# Patient Record
Sex: MALE | Race: WHITE | NOT HISPANIC OR LATINO | Employment: FULL TIME | URBAN - METROPOLITAN AREA
[De-identification: names, ages, dates, MRNs, and addresses within clinical notes are randomized per-mention and may not be internally consistent; named-entity substitution may affect disease eponyms.]

---

## 2017-03-08 ENCOUNTER — HOSPITAL ENCOUNTER (OUTPATIENT)
Dept: PAIN MEDICINE | Facility: HOSPITAL | Age: 37
Discharge: HOME | End: 2017-03-08
Attending: ANESTHESIOLOGY | Admitting: ANESTHESIOLOGY

## 2017-04-18 ENCOUNTER — HOSPITAL ENCOUNTER (OUTPATIENT)
Dept: PAIN MEDICINE | Facility: HOSPITAL | Age: 37
Discharge: HOME | End: 2017-04-18
Attending: ANESTHESIOLOGY | Admitting: ANESTHESIOLOGY

## 2017-05-17 ENCOUNTER — HOSPITAL ENCOUNTER (OUTPATIENT)
Dept: PAIN MEDICINE | Facility: HOSPITAL | Age: 37
Discharge: HOME | End: 2017-05-17
Attending: ANESTHESIOLOGY | Admitting: ANESTHESIOLOGY

## 2017-06-01 ENCOUNTER — HOSPITAL ENCOUNTER (OUTPATIENT)
Dept: PAIN MEDICINE | Facility: HOSPITAL | Age: 37
Discharge: HOME | End: 2017-06-01
Attending: ANESTHESIOLOGY | Admitting: ANESTHESIOLOGY

## 2017-06-08 ENCOUNTER — HOSPITAL ENCOUNTER (OUTPATIENT)
Dept: PAIN MEDICINE | Facility: HOSPITAL | Age: 37
Discharge: HOME | End: 2017-06-08
Attending: ANESTHESIOLOGY | Admitting: ANESTHESIOLOGY

## 2017-06-21 ENCOUNTER — HOSPITAL ENCOUNTER (OUTPATIENT)
Dept: PAIN MEDICINE | Facility: HOSPITAL | Age: 37
Discharge: HOME | End: 2017-06-21
Attending: ANESTHESIOLOGY | Admitting: ANESTHESIOLOGY

## 2017-06-29 ENCOUNTER — HOSPITAL ENCOUNTER (OUTPATIENT)
Dept: PAIN MEDICINE | Facility: HOSPITAL | Age: 37
Discharge: HOME | End: 2017-06-29
Attending: ANESTHESIOLOGY | Admitting: ANESTHESIOLOGY

## 2017-08-17 ENCOUNTER — HOSPITAL ENCOUNTER (OUTPATIENT)
Dept: PAIN MEDICINE | Facility: HOSPITAL | Age: 37
Discharge: HOME | End: 2017-08-17
Attending: ANESTHESIOLOGY | Admitting: ANESTHESIOLOGY

## 2017-09-12 ENCOUNTER — HOSPITAL ENCOUNTER (OUTPATIENT)
Dept: PAIN MEDICINE | Facility: HOSPITAL | Age: 37
Discharge: HOME | End: 2017-09-12
Attending: ANESTHESIOLOGY | Admitting: ANESTHESIOLOGY

## 2017-09-27 ENCOUNTER — HOSPITAL ENCOUNTER (OUTPATIENT)
Dept: PAIN MEDICINE | Facility: HOSPITAL | Age: 37
Discharge: HOME | End: 2017-09-27
Attending: ANESTHESIOLOGY | Admitting: ANESTHESIOLOGY

## 2017-10-09 ENCOUNTER — HOSPITAL ENCOUNTER (OUTPATIENT)
Dept: PAIN MEDICINE | Facility: HOSPITAL | Age: 37
Discharge: HOME | End: 2017-10-09
Attending: ANESTHESIOLOGY | Admitting: ANESTHESIOLOGY

## 2017-10-12 ENCOUNTER — HOSPITAL ENCOUNTER (OUTPATIENT)
Dept: PAIN MEDICINE | Facility: HOSPITAL | Age: 37
Discharge: HOME | End: 2017-10-12
Attending: ANESTHESIOLOGY | Admitting: ANESTHESIOLOGY

## 2017-10-24 ENCOUNTER — HOSPITAL ENCOUNTER (OUTPATIENT)
Dept: PAIN MEDICINE | Facility: HOSPITAL | Age: 37
Discharge: HOME | End: 2017-10-24
Attending: ANESTHESIOLOGY | Admitting: ANESTHESIOLOGY

## 2017-11-14 ENCOUNTER — HOSPITAL ENCOUNTER (OUTPATIENT)
Dept: PAIN MEDICINE | Facility: HOSPITAL | Age: 37
Discharge: HOME | End: 2017-11-14
Attending: ANESTHESIOLOGY | Admitting: ANESTHESIOLOGY

## 2017-11-15 ENCOUNTER — HOSPITAL ENCOUNTER (OUTPATIENT)
Dept: PAIN MEDICINE | Facility: HOSPITAL | Age: 37
Discharge: HOME | End: 2017-11-15
Attending: ANESTHESIOLOGY | Admitting: ANESTHESIOLOGY

## 2017-11-30 ENCOUNTER — HOSPITAL ENCOUNTER (OUTPATIENT)
Dept: PAIN MEDICINE | Facility: HOSPITAL | Age: 37
Discharge: HOME | End: 2017-11-30
Attending: ANESTHESIOLOGY | Admitting: ANESTHESIOLOGY

## 2017-12-06 ENCOUNTER — HOSPITAL ENCOUNTER (OUTPATIENT)
Dept: PAIN MEDICINE | Facility: HOSPITAL | Age: 37
Discharge: HOME | End: 2017-12-06
Attending: ANESTHESIOLOGY | Admitting: ANESTHESIOLOGY

## 2017-12-27 ENCOUNTER — HOSPITAL ENCOUNTER (OUTPATIENT)
Dept: PAIN MEDICINE | Facility: HOSPITAL | Age: 37
Discharge: HOME | End: 2017-12-27
Attending: ANESTHESIOLOGY | Admitting: ANESTHESIOLOGY

## 2018-01-17 ENCOUNTER — HOSPITAL ENCOUNTER (OUTPATIENT)
Dept: PAIN MEDICINE | Facility: HOSPITAL | Age: 38
Discharge: HOME | End: 2018-01-17
Attending: ANESTHESIOLOGY | Admitting: ANESTHESIOLOGY

## 2018-01-23 ENCOUNTER — HOSPITAL ENCOUNTER (OUTPATIENT)
Dept: PAIN MEDICINE | Facility: HOSPITAL | Age: 38
Discharge: HOME | End: 2018-01-23
Attending: ANESTHESIOLOGY | Admitting: ANESTHESIOLOGY

## 2018-02-14 ENCOUNTER — HOSPITAL ENCOUNTER (OUTPATIENT)
Dept: PAIN MEDICINE | Facility: HOSPITAL | Age: 38
Discharge: HOME | End: 2018-02-14
Attending: ANESTHESIOLOGY | Admitting: ANESTHESIOLOGY

## 2018-03-13 ENCOUNTER — HOSPITAL ENCOUNTER (OUTPATIENT)
Dept: PAIN MEDICINE | Facility: HOSPITAL | Age: 38
Discharge: HOME | End: 2018-03-13
Attending: ANESTHESIOLOGY | Admitting: ANESTHESIOLOGY

## 2018-04-10 ENCOUNTER — HOSPITAL ENCOUNTER (OUTPATIENT)
Dept: PAIN MEDICINE | Facility: HOSPITAL | Age: 38
Discharge: HOME | End: 2018-04-10
Attending: ANESTHESIOLOGY | Admitting: ANESTHESIOLOGY

## 2018-10-01 ENCOUNTER — HOSPITAL ENCOUNTER (OUTPATIENT)
Dept: FAMILY MEDICINE CLINIC | Facility: CLINIC | Age: 38
Setting detail: SPECIMEN
Discharge: HOME | End: 2018-10-01
Attending: FAMILY MEDICINE | Admitting: FAMILY MEDICINE

## 2018-10-01 LAB
ALBUMIN SERPL-MCNC: 4.3 G/DL (ref 3.5–4.8)
ALBUMIN/GLOB SERPL: 1.3 {RATIO} (ref 1–1.7)
ALP SERPL-CCNC: 109 IU/L (ref 32–91)
ALT SERPL-CCNC: 29 IU/L (ref 17–63)
ANION GAP SERPL CALC-SCNC: 11.4 MMOL/L (ref 10–20)
AST SERPL-CCNC: 26 IU/L (ref 15–41)
BASOPHILS # BLD AUTO: 0 10*3/UL (ref 0–0.2)
BASOPHILS NFR BLD AUTO: 1 % (ref 0–2)
BILIRUB SERPL-MCNC: 0.9 MG/DL (ref 0.3–1.2)
BUN SERPL-MCNC: 9 MG/DL (ref 8–20)
BUN/CREAT SERPL: 11.3 (ref 6.2–20.3)
CALCIUM SERPL-MCNC: 9.7 MG/DL (ref 8.9–10.3)
CHLORIDE SERPL-SCNC: 106 MMOL/L (ref 101–111)
CONV CO2: 26 MMOL/L (ref 22–32)
CONV TOTAL PROTEIN: 7.5 G/DL (ref 6.1–7.9)
CREAT UR-MCNC: 0.8 MG/DL (ref 0.7–1.2)
DIFFERENTIAL METHOD BLD: (no result)
EOSINOPHIL # BLD AUTO: 0.4 10*3/UL (ref 0–0.3)
EOSINOPHIL # BLD AUTO: 7 % (ref 0–3)
ERYTHROCYTE [DISTWIDTH] IN BLOOD BY AUTOMATED COUNT: 13 % (ref 11.5–14.5)
GLOBULIN UR ELPH-MCNC: 3.2 G/DL (ref 2.5–3.8)
GLUCOSE SERPL-MCNC: 81 MG/DL (ref 65–99)
HCT VFR BLD AUTO: 45.2 % (ref 40–54)
HGB BLD-MCNC: 16 G/DL (ref 14–18)
LYMPHOCYTES # BLD AUTO: 2.3 10*3/UL (ref 0.8–4.8)
LYMPHOCYTES NFR BLD AUTO: 36 % (ref 18–42)
MCH RBC QN AUTO: 31.2 PG (ref 26–32)
MCHC RBC AUTO-ENTMCNC: 35.3 G/DL (ref 32–36)
MCV RBC AUTO: 88.3 FL (ref 80–94)
MONOCYTES # BLD AUTO: 0.4 10*3/UL (ref 0.1–1.3)
MONOCYTES NFR BLD AUTO: 7 % (ref 2–11)
NEUTROPHILS # BLD AUTO: 3.3 10*3/UL (ref 2.3–8.6)
NEUTROPHILS NFR BLD AUTO: 49 % (ref 50–75)
NRBC BLD AUTO-RTO: 0 /100{WBCS}
NRBC/RBC NFR BLD MANUAL: 0 10*3/UL
PLATELET # BLD AUTO: 206 10*3/UL (ref 150–450)
PMV BLD AUTO: 8.5 FL (ref 7.4–10.4)
POTASSIUM SERPL-SCNC: 4.4 MMOL/L (ref 3.6–5.1)
RBC # BLD AUTO: 5.12 10*6/UL (ref 4.6–6)
SODIUM SERPL-SCNC: 139 MMOL/L (ref 136–144)
WBC # BLD AUTO: 6.5 10*3/UL (ref 4.5–11.5)

## 2018-12-27 ENCOUNTER — HOSPITAL ENCOUNTER (OUTPATIENT)
Dept: SLEEP MEDICINE | Facility: HOSPITAL | Age: 38
Discharge: HOME | End: 2018-12-27
Attending: PSYCHIATRY & NEUROLOGY | Admitting: PSYCHIATRY & NEUROLOGY

## 2019-02-28 ENCOUNTER — HOSPITAL ENCOUNTER (OUTPATIENT)
Dept: OTHER | Facility: HOSPITAL | Age: 39
Discharge: HOME | End: 2019-02-28
Attending: FAMILY MEDICINE | Admitting: FAMILY MEDICINE

## 2019-02-28 LAB
ALBUMIN SERPL-MCNC: 4 G/DL (ref 3.5–4.8)
ALBUMIN/GLOB SERPL: 1.3 {RATIO} (ref 1–1.7)
ALP SERPL-CCNC: 88 IU/L (ref 32–91)
ALT SERPL-CCNC: 25 IU/L (ref 17–63)
ANION GAP SERPL CALC-SCNC: 13.9 MMOL/L (ref 10–20)
AST SERPL-CCNC: 23 IU/L (ref 15–41)
BASOPHILS # BLD AUTO: 0 10*3/UL (ref 0–0.2)
BASOPHILS NFR BLD AUTO: 1 % (ref 0–2)
BILIRUB SERPL-MCNC: 0.5 MG/DL (ref 0.3–1.2)
BUN SERPL-MCNC: 10 MG/DL (ref 8–20)
BUN/CREAT SERPL: 11.1 (ref 6.2–20.3)
CALCIUM SERPL-MCNC: 9 MG/DL (ref 8.9–10.3)
CHLORIDE SERPL-SCNC: 102 MMOL/L (ref 101–111)
CONV CO2: 25 MMOL/L (ref 22–32)
CONV TOTAL PROTEIN: 7.1 G/DL (ref 6.1–7.9)
CREAT UR-MCNC: 0.9 MG/DL (ref 0.7–1.2)
DIFFERENTIAL METHOD BLD: (no result)
EOSINOPHIL # BLD AUTO: 0.5 10*3/UL (ref 0–0.3)
EOSINOPHIL # BLD AUTO: 9 % (ref 0–3)
ERYTHROCYTE [DISTWIDTH] IN BLOOD BY AUTOMATED COUNT: 12.6 % (ref 11.5–14.5)
GLOBULIN UR ELPH-MCNC: 3.1 G/DL (ref 2.5–3.8)
GLUCOSE SERPL-MCNC: 85 MG/DL (ref 65–99)
HCT VFR BLD AUTO: 43.4 % (ref 40–54)
HGB BLD-MCNC: 15.1 G/DL (ref 14–18)
LYMPHOCYTES # BLD AUTO: 2.2 10*3/UL (ref 0.8–4.8)
LYMPHOCYTES NFR BLD AUTO: 37 % (ref 18–42)
MCH RBC QN AUTO: 31.7 PG (ref 26–32)
MCHC RBC AUTO-ENTMCNC: 34.7 G/DL (ref 32–36)
MCV RBC AUTO: 91.4 FL (ref 80–94)
MONOCYTES # BLD AUTO: 0.4 10*3/UL (ref 0.1–1.3)
MONOCYTES NFR BLD AUTO: 7 % (ref 2–11)
NEUTROPHILS # BLD AUTO: 2.8 10*3/UL (ref 2.3–8.6)
NEUTROPHILS NFR BLD AUTO: 46 % (ref 50–75)
NRBC BLD AUTO-RTO: 0 /100{WBCS}
NRBC/RBC NFR BLD MANUAL: 0 10*3/UL
PLATELET # BLD AUTO: 198 10*3/UL (ref 150–450)
PMV BLD AUTO: 8 FL (ref 7.4–10.4)
POTASSIUM SERPL-SCNC: 3.9 MMOL/L (ref 3.6–5.1)
RBC # BLD AUTO: 4.75 10*6/UL (ref 4.6–6)
SODIUM SERPL-SCNC: 137 MMOL/L (ref 136–144)
WBC # BLD AUTO: 6 10*3/UL (ref 4.5–11.5)

## 2019-08-29 ENCOUNTER — TELEPHONE (OUTPATIENT)
Dept: FAMILY MEDICINE CLINIC | Facility: CLINIC | Age: 39
End: 2019-08-29

## 2021-01-20 RX ORDER — TRAZODONE HYDROCHLORIDE 100 MG/1
TABLET ORAL
Qty: 90 TABLET | Refills: 0 | OUTPATIENT
Start: 2021-01-20

## 2021-03-30 ENCOUNTER — IMMUNIZATION (OUTPATIENT)
Dept: VACCINE CLINIC | Facility: HOSPITAL | Age: 41
End: 2021-03-30

## 2021-03-30 PROCEDURE — 0001A: CPT | Performed by: OBSTETRICS & GYNECOLOGY

## 2021-03-30 PROCEDURE — 91300 HC SARSCOV02 VAC 30MCG/0.3ML IM: CPT | Performed by: OBSTETRICS & GYNECOLOGY

## 2021-04-20 ENCOUNTER — APPOINTMENT (OUTPATIENT)
Dept: VACCINE CLINIC | Facility: HOSPITAL | Age: 41
End: 2021-04-20

## 2021-04-26 ENCOUNTER — IMMUNIZATION (OUTPATIENT)
Dept: VACCINE CLINIC | Facility: HOSPITAL | Age: 41
End: 2021-04-26

## 2021-04-26 PROCEDURE — 91300 HC SARSCOV02 VAC 30MCG/0.3ML IM: CPT | Performed by: OBSTETRICS & GYNECOLOGY

## 2021-04-26 PROCEDURE — 0002A: CPT | Performed by: OBSTETRICS & GYNECOLOGY

## 2022-04-28 ENCOUNTER — OFFICE (OUTPATIENT)
Dept: URBAN - METROPOLITAN AREA CLINIC 64 | Facility: CLINIC | Age: 42
End: 2022-04-28
Payer: MEDICAID

## 2022-04-28 VITALS
HEIGHT: 73 IN | WEIGHT: 257 LBS | HEART RATE: 100 BPM | DIASTOLIC BLOOD PRESSURE: 109 MMHG | SYSTOLIC BLOOD PRESSURE: 129 MMHG

## 2022-04-28 DIAGNOSIS — R94.5 ABNORMAL RESULTS OF LIVER FUNCTION STUDIES: ICD-10-CM

## 2022-04-28 PROCEDURE — 99204 OFFICE O/P NEW MOD 45 MIN: CPT | Performed by: INTERNAL MEDICINE

## 2022-07-20 ENCOUNTER — OFFICE (OUTPATIENT)
Dept: URBAN - METROPOLITAN AREA CLINIC 64 | Facility: CLINIC | Age: 42
End: 2022-07-20
Payer: MEDICAID

## 2022-07-20 VITALS
HEIGHT: 73 IN | WEIGHT: 253 LBS | HEART RATE: 77 BPM | SYSTOLIC BLOOD PRESSURE: 134 MMHG | DIASTOLIC BLOOD PRESSURE: 82 MMHG

## 2022-07-20 DIAGNOSIS — R94.5 ABNORMAL RESULTS OF LIVER FUNCTION STUDIES: ICD-10-CM

## 2022-07-20 PROCEDURE — 99213 OFFICE O/P EST LOW 20 MIN: CPT | Performed by: NURSE PRACTITIONER

## 2022-09-20 ENCOUNTER — OFFICE (OUTPATIENT)
Dept: URBAN - METROPOLITAN AREA CLINIC 64 | Facility: CLINIC | Age: 42
End: 2022-09-20
Payer: MEDICAID

## 2022-09-20 VITALS
SYSTOLIC BLOOD PRESSURE: 115 MMHG | HEIGHT: 73 IN | WEIGHT: 243 LBS | DIASTOLIC BLOOD PRESSURE: 75 MMHG | HEART RATE: 72 BPM

## 2022-09-20 DIAGNOSIS — E83.110 HEREDITARY HEMOCHROMATOSIS: ICD-10-CM

## 2022-09-20 PROCEDURE — 99214 OFFICE O/P EST MOD 30 MIN: CPT | Performed by: INTERNAL MEDICINE

## 2022-09-20 RX ORDER — ZINC GLUCONATE 50 MG
50 TABLET ORAL
Qty: 30 | Refills: 0 | Status: COMPLETED
Start: 2022-09-20 | End: 2023-01-23

## 2023-01-23 ENCOUNTER — OFFICE (OUTPATIENT)
Dept: URBAN - METROPOLITAN AREA CLINIC 64 | Facility: CLINIC | Age: 43
End: 2023-01-23
Payer: MEDICAID

## 2023-01-23 VITALS
DIASTOLIC BLOOD PRESSURE: 80 MMHG | WEIGHT: 235 LBS | HEIGHT: 73 IN | SYSTOLIC BLOOD PRESSURE: 114 MMHG | HEART RATE: 74 BPM

## 2023-01-23 DIAGNOSIS — E83.110 HEREDITARY HEMOCHROMATOSIS: ICD-10-CM

## 2023-01-23 DIAGNOSIS — K21.9 GASTRO-ESOPHAGEAL REFLUX DISEASE WITHOUT ESOPHAGITIS: ICD-10-CM

## 2023-01-23 DIAGNOSIS — K59.00 CONSTIPATION, UNSPECIFIED: ICD-10-CM

## 2023-01-23 DIAGNOSIS — R53.83 OTHER FATIGUE: ICD-10-CM

## 2023-01-23 DIAGNOSIS — R14.0 ABDOMINAL DISTENSION (GASEOUS): ICD-10-CM

## 2023-01-23 DIAGNOSIS — R10.12 LEFT UPPER QUADRANT PAIN: ICD-10-CM

## 2023-01-23 PROCEDURE — 99213 OFFICE O/P EST LOW 20 MIN: CPT | Performed by: NURSE PRACTITIONER

## 2023-01-23 RX ORDER — FAMOTIDINE 40 MG/1
40 TABLET, FILM COATED ORAL
Qty: 30 | Refills: 11 | Status: COMPLETED
Start: 2023-01-23 | End: 2023-12-21

## 2023-05-22 ENCOUNTER — OFFICE (OUTPATIENT)
Dept: URBAN - METROPOLITAN AREA CLINIC 64 | Facility: CLINIC | Age: 43
End: 2023-05-22
Payer: MEDICAID

## 2023-05-22 VITALS
HEIGHT: 73 IN | DIASTOLIC BLOOD PRESSURE: 100 MMHG | SYSTOLIC BLOOD PRESSURE: 139 MMHG | WEIGHT: 228 LBS | HEART RATE: 84 BPM

## 2023-05-22 DIAGNOSIS — E83.110 HEREDITARY HEMOCHROMATOSIS: ICD-10-CM

## 2023-05-22 DIAGNOSIS — R53.83 OTHER FATIGUE: ICD-10-CM

## 2023-05-22 DIAGNOSIS — R74.8 ABNORMAL LEVELS OF OTHER SERUM ENZYMES: ICD-10-CM

## 2023-05-22 PROCEDURE — 99214 OFFICE O/P EST MOD 30 MIN: CPT | Performed by: INTERNAL MEDICINE

## 2023-07-25 NOTE — PROGRESS NOTES
Chief Complaint  NEW PATIENT (WES/)    Trey Lamar presents to [facility] NEUROLOGY  History of Present Illness    Sleep testing history:    [facility] patient had Mild obstructive sleep apnea syndrome with apnea-hypopnea index of 7.7 per sleep hour, minimum SpO2 of 88%    Tired all the time,     Usually bedtime 10 up 9 to 11,   Once every 2 weeks , stays a wake all night then may sleep in the day.     Has been to a hematologist, normal except hemochromatosis.     Weight about 50 lbs less in past year.     Has had COVID symptoms     Does waken choking at times, and  occ sleep paralysis episodes.     Has had lots of blood test, nothing found has treated low vit d, has checked labs / vitamin levels all ok and taking vitamin supplement     EPWORTH SLEEPINESS SCALE  Sitting and reading 1 WatchingTV 1  Sitting, inactive, in a public place 1  As a passenger in a car for 1 hour w/o a break  2  Lying down to rest in the afternoon  2  Sitting and talking to someone  0  Sitting quietly after a lunch  1  In a car, while stopped for traffic or a light  0  Total 8  =========================================================2018================  Referring Provider:  Stephani Goodman MD   Primary Provider:  Stephani Goodman MD     CC:  sleep disturbance and possible WES.     History of Present Illness:  This is a 38 years old male who presents with possible WES     New pt. referred by pcp, neck measures 16 inches.      Pt. been told he snores part of the time more on the back   wakes sob and rare choking.   sleeps easily 8-10 hours a day and still feels fatigue.   trouble waking at 7am  gets sleepy  , on strict schedule of activity 12 hours.  goal 14 hours.   Pt. wakes up every morning around 10 am.   awakening with gasping and awakening with difficulty breathing.        Pt. has had recent weight lose down 32 pounds   exercising and following plan from [facility]      Pt. takes  "trazadone for headaches at night and hard time getting to sleep and once asleep he's out like a light.      Pt. has chronic migraines tx by Dr. Grimes sent to [facility]. Currently the migraines are under control,     no rls , has sleep talking but no other parasomnia    Impression & Recommendations:     Problem # 1:  Sleep disorder (ICD-780.50) (ZWK78-J24.9)  Assessment: Unchanged  probable significant lyndon   npsg and pap if indicated     Problem # 2:  Migraine headache (ICD-346.90) (GJH06-T61.909)  Assessment: Comment Only  If pt has LYNDON, this will contribute to difficulty controlling the migraine  Orders:  Office Consult, Level IV (94744)  81793 NPSG - Polysom (test) (44772)     [place] Sleepiness Scale      Sitting and reading:                             3  Watching TV:                                 3  Sitting inactive in a public place (e.g. a theater or a meeting):    2  As a passenger in a car for an hour without a break:             3  Lying down to rest in the afternoon when circumstances permit:   3  Sitting and talking to someone:                  0  Sitting quietly after a lunch without alcohol:               3  In a car, while stopped for a few minutes in traffic:            0     EPWORTH SLEEPINESS SCORE:                   17     ==============================================================================  _______________________________________________________________________    Review of Systems   Constitutional:  Positive for fatigue.   HENT:  Positive for nosebleeds.    Musculoskeletal:  Positive for myalgias.   Neurological:  Positive for headaches.   All other systems reviewed and are negative.    Objective   Vital Signs:   /89   Pulse 79   Resp 18   Ht 185.4 cm (73\")   Wt 102 kg (224 lb)   BMI 29.55 kg/m²     Physical Exam  Vitals reviewed.   Constitutional:       Appearance: Normal appearance.   Cardiovascular:      Rate and Rhythm: Normal rate.   Pulmonary:      Effort: " Pulmonary effort is normal.   Neurological:      General: No focal deficit present.      Mental Status: He is alert.   Psychiatric:         Mood and Affect: Mood normal.      Result Review :                 Assessment and Plan    Diagnoses and all orders for this visit:    1. Chronic fatigue (Primary)  -     Home Sleep Study; Future    2. Obstructive sleep apnea  -     Home Sleep Study; Future    The previous sleep test did show mild lyndon which can cause chronic fatigue  Therefore repeating the HST to evaluate for persistent lyndon despite the recent weight loss is indicated.  Treatment with cpap is recommended if there is evidence of LYNDON     Follow Up   Return for Follow Up visit 30 to 90 days after obtaining PAP.  Patient was given instructions and counseling regarding his condition or for health maintenance advice. Please see specific information pulled into the AVS if appropriate.       This document has been electronically signed by Joseph Seipel, MD on July 26, 2023 17:59 EDT

## 2023-07-26 ENCOUNTER — OFFICE VISIT (OUTPATIENT)
Dept: NEUROLOGY | Facility: CLINIC | Age: 43
End: 2023-07-26
Payer: COMMERCIAL

## 2023-07-26 VITALS
BODY MASS INDEX: 29.69 KG/M2 | RESPIRATION RATE: 18 BRPM | SYSTOLIC BLOOD PRESSURE: 133 MMHG | HEART RATE: 79 BPM | WEIGHT: 224 LBS | HEIGHT: 73 IN | DIASTOLIC BLOOD PRESSURE: 89 MMHG

## 2023-07-26 DIAGNOSIS — G47.33 OBSTRUCTIVE SLEEP APNEA: ICD-10-CM

## 2023-07-26 DIAGNOSIS — R53.82 CHRONIC FATIGUE: Primary | ICD-10-CM

## 2023-07-26 PROBLEM — L30.9 ECZEMA OF HAND: Status: ACTIVE | Noted: 2018-10-01

## 2023-07-26 PROBLEM — Z81.8 FAMILY HISTORY OF OTHER MENTAL AND BEHAVIORAL DISORDERS: Status: ACTIVE | Noted: 2023-07-26

## 2023-07-26 PROBLEM — E83.119 HEMOCHROMATOSIS: Status: ACTIVE | Noted: 2023-05-24

## 2023-07-26 PROBLEM — E55.9 HYPOVITAMINOSIS D: Status: ACTIVE | Noted: 2018-06-20

## 2023-07-26 PROBLEM — G89.29 CHRONIC PAIN: Status: ACTIVE | Noted: 2017-10-09

## 2023-07-26 PROBLEM — R79.89 ELEVATED FERRITIN: Status: ACTIVE | Noted: 2023-07-26

## 2023-07-26 PROBLEM — R74.8 ABNORMAL LEVELS OF OTHER SERUM ENZYMES: Status: ACTIVE | Noted: 2023-07-26

## 2023-07-26 PROBLEM — G47.9 SLEEP DISORDER: Status: ACTIVE | Noted: 2018-10-01

## 2023-07-26 PROBLEM — F41.9 ANXIETY DISORDER: Status: ACTIVE | Noted: 2018-10-01

## 2023-07-26 PROBLEM — R05.3 PERSISTENT COUGH: Status: ACTIVE | Noted: 2019-02-28

## 2023-07-26 PROBLEM — F32.A DEPRESSION: Status: ACTIVE | Noted: 2018-06-04

## 2023-07-26 PROBLEM — K51.90 ULCERATIVE COLITIS: Status: ACTIVE | Noted: 2023-07-26

## 2023-07-26 PROBLEM — Z02.89 ENCOUNTER FOR OTHER ADMINISTRATIVE EXAMINATIONS: Status: ACTIVE | Noted: 2019-02-28

## 2023-07-26 PROBLEM — K57.30 DIVERTICULOSIS OF COLON: Status: ACTIVE | Noted: 2023-07-26

## 2023-07-26 PROBLEM — R53.83 FATIGUE: Status: ACTIVE | Noted: 2023-07-26

## 2023-07-26 PROBLEM — G43.719 INTRACTABLE CHRONIC MIGRAINE WITHOUT AURA: Status: ACTIVE | Noted: 2017-10-09

## 2023-07-26 PROBLEM — K80.20 GALLSTONES: Status: ACTIVE | Noted: 2023-07-26

## 2023-07-26 PROBLEM — Z80.3 FAMILY HISTORY OF MALIGNANT NEOPLASM OF BREAST: Status: ACTIVE | Noted: 2023-07-26

## 2023-07-26 PROCEDURE — 99204 OFFICE O/P NEW MOD 45 MIN: CPT | Performed by: PSYCHIATRY & NEUROLOGY

## 2023-07-26 RX ORDER — CHOLECALCIFEROL (VITAMIN D3) 1250 MCG
CAPSULE ORAL
COMMUNITY
End: 2023-07-26

## 2023-07-26 RX ORDER — MULTIPLE VITAMINS W/ MINERALS TAB 9MG-400MCG
TAB ORAL
COMMUNITY

## 2023-07-26 RX ORDER — VENLAFAXINE HYDROCHLORIDE 75 MG/1
1 CAPSULE, EXTENDED RELEASE ORAL DAILY
COMMUNITY
Start: 2023-07-10

## 2023-07-26 RX ORDER — ESCITALOPRAM OXALATE 10 MG/1
TABLET ORAL EVERY 24 HOURS
COMMUNITY
Start: 2018-04-10 | End: 2023-07-26

## 2023-07-26 RX ORDER — TRAZODONE HYDROCHLORIDE 50 MG/1
50 TABLET ORAL NIGHTLY PRN
COMMUNITY

## 2023-07-26 RX ORDER — MIRTAZAPINE 15 MG/1
15 TABLET, FILM COATED ORAL
COMMUNITY
Start: 2023-07-10

## 2023-07-26 RX ORDER — SUMATRIPTAN 6 MG/.5ML
INJECTION, SOLUTION SUBCUTANEOUS
COMMUNITY
Start: 2018-10-01

## 2023-07-26 RX ORDER — PREGABALIN 150 MG/1
CAPSULE ORAL
COMMUNITY
Start: 2018-08-22 | End: 2023-07-26

## 2023-07-26 RX ORDER — DULOXETIN HYDROCHLORIDE 60 MG/1
1 CAPSULE, DELAYED RELEASE ORAL EVERY 24 HOURS
COMMUNITY
Start: 2018-06-18 | End: 2023-07-26

## 2023-07-26 RX ORDER — BUPROPION HYDROCHLORIDE 150 MG/1
150 TABLET, EXTENDED RELEASE ORAL 2 TIMES DAILY
COMMUNITY
End: 2023-07-26

## 2023-07-28 ENCOUNTER — PATIENT ROUNDING (BHMG ONLY) (OUTPATIENT)
Dept: NEUROLOGY | Facility: CLINIC | Age: 43
End: 2023-07-28
Payer: COMMERCIAL

## 2023-08-01 ENCOUNTER — HOSPITAL ENCOUNTER (OUTPATIENT)
Dept: SLEEP MEDICINE | Facility: HOSPITAL | Age: 43
Discharge: HOME | End: 2023-08-01
Admitting: PSYCHIATRY & NEUROLOGY
Payer: MEDICAID

## 2023-08-01 DIAGNOSIS — G47.33 OBSTRUCTIVE SLEEP APNEA: ICD-10-CM

## 2023-08-01 DIAGNOSIS — R53.82 CHRONIC FATIGUE: ICD-10-CM

## 2023-08-01 PROCEDURE — 95806 SLEEP STUDY UNATT&RESP EFFT: CPT

## 2023-08-15 ENCOUNTER — TELEPHONE (OUTPATIENT)
Dept: NEUROLOGY | Facility: CLINIC | Age: 43
End: 2023-08-15
Payer: MEDICAID

## 2023-08-15 NOTE — TELEPHONE ENCOUNTER
----- Message from Joseph F Seipel, MD sent at 8/2/2023  6:52 PM EDT -----  The home sleep test did not show any sleep apnea.  Schedule follow-up visit

## 2023-08-15 NOTE — TELEPHONE ENCOUNTER
LM for patient. Study was normal.  Follow-up appt with Dr. Seipel needs made to discuss options for better sleep.    HUB ok to read and schedule follow-up.

## 2023-08-17 NOTE — PROGRESS NOTES
"Chief Complaint  Follow-up    Subjective          Veronica Lamar presents to [facility] NEUROLOGY  History of Present Illness  F/U after normal sleep study  Sleeps about 10-11 hours per 24 hours on the average.   Sleep testing history:    On NPSG at [facility] , 8/1/23 patient had  apnea-hypopnea index of 0.8 per sleep hour, minimum SpO2 of 85%  [place] Sleepiness Scale:  Sitting and reading 1 WatchingTV 1  Sitting, inactive, in a public place 1  As a passenger in a car for 1 hour w/o a break  2  Lying down to rest in the afternoon  1  Sitting and talking to someone  0  Sitting quietly after a lunch  1  In a car, while stopped for traffic or a light  0  Total 7    Has migraine about once per month takes advil for that    Saw dr gallo for hdez in 2016, then pain clinic, then dr delaney, the [facility] for the occipital nerve surgery.    Now concerned about pain in hands feet wrist, lower back pain, neck stiffness.   And fatigue, not sleepy,       Review of Systems   Constitutional:  Positive for activity change, appetite change, chills and fatigue.   Endocrine: Positive for cold intolerance and heat intolerance.   Neurological:  Positive for dizziness, light-headedness and headaches.   All other systems reviewed and are negative.      Objective   Vital Signs:   /82   Pulse 76   Resp 16   Ht 185.4 cm (73\")   Wt 103 kg (226 lb)   BMI 29.82 kg/mý     Physical Exam  Vitals reviewed.   Constitutional:       Appearance: Normal appearance.   HENT:      Nose: Nose normal.   Cardiovascular:      Rate and Rhythm: Normal rate.   Pulmonary:      Effort: Pulmonary effort is normal.   Musculoskeletal:         General: Normal range of motion.   Neurological:      General: No focal deficit present.      Mental Status: He is alert and oriented to person, place, and time.   Psychiatric:         Mood and Affect: Mood normal.      Result Review :                 Assessment and Plan    Diagnoses and all orders " for this visit:    1. Sleep disorder (Primary)  -     TSH Rfx On Abnormal To Free T4  -     Vitamin D 25 Hydroxy; Future  -     Vitamin B12; Future  -     Folate; Future  -     Vitamin B6 (Pyridoxine); Future  -     Vitamin E; Future  -     Lyme Disease Total Antibody With Reflex to Immunoassay; Future  -     Sedimentation Rate; Future    2. Chronic fatigue  -     TSH Rfx On Abnormal To Free T4  -     Vitamin D 25 Hydroxy; Future  -     Vitamin B12; Future  -     Folate; Future  -     Vitamin B6 (Pyridoxine); Future  -     Vitamin E; Future  -     Lyme Disease Total Antibody With Reflex to Immunoassay; Future  -     Sedimentation Rate; Future    3. Vitamin D deficiency  -     Vitamin D 25 Hydroxy; Future        The headaches are mostly resolved  The current major complaint is chronic fatigue, and pain in joints  Will obtain labs.       Follow Up   No follow-ups on file.    Patient was given instructions and counseling regarding his condition or for health maintenance advice. Please see specific information pulled into the AVS if appropriate.       This document has been electronically signed by Joseph Seipel, MD on August 22, 2023 10:58 EDT

## 2023-08-22 ENCOUNTER — OFFICE VISIT (OUTPATIENT)
Dept: NEUROLOGY | Facility: CLINIC | Age: 43
End: 2023-08-22
Payer: MEDICAID

## 2023-08-22 ENCOUNTER — LAB (OUTPATIENT)
Dept: LAB | Facility: HOSPITAL | Age: 43
End: 2023-08-22
Payer: MEDICAID

## 2023-08-22 VITALS
SYSTOLIC BLOOD PRESSURE: 113 MMHG | RESPIRATION RATE: 16 BRPM | DIASTOLIC BLOOD PRESSURE: 82 MMHG | WEIGHT: 226 LBS | BODY MASS INDEX: 29.95 KG/M2 | HEIGHT: 73 IN | HEART RATE: 76 BPM

## 2023-08-22 DIAGNOSIS — E55.9 VITAMIN D DEFICIENCY: ICD-10-CM

## 2023-08-22 DIAGNOSIS — R53.82 CHRONIC FATIGUE: ICD-10-CM

## 2023-08-22 DIAGNOSIS — G47.9 SLEEP DISORDER: ICD-10-CM

## 2023-08-22 DIAGNOSIS — G47.9 SLEEP DISORDER: Primary | ICD-10-CM

## 2023-08-22 LAB
25(OH)D3 SERPL-MCNC: 43.2 NG/ML (ref 30–100)
ERYTHROCYTE [SEDIMENTATION RATE] IN BLOOD: 11 MM/HR (ref 0–15)
FOLATE SERPL-MCNC: >20 NG/ML (ref 4.78–24.2)
TSH SERPL DL<=0.05 MIU/L-ACNC: 0.55 UIU/ML (ref 0.27–4.2)
VIT B12 BLD-MCNC: 689 PG/ML (ref 211–946)

## 2023-08-22 PROCEDURE — 1159F MED LIST DOCD IN RCRD: CPT | Performed by: PSYCHIATRY & NEUROLOGY

## 2023-08-22 PROCEDURE — 1160F RVW MEDS BY RX/DR IN RCRD: CPT | Performed by: PSYCHIATRY & NEUROLOGY

## 2023-08-22 PROCEDURE — 82607 VITAMIN B-12: CPT

## 2023-08-22 PROCEDURE — 82746 ASSAY OF FOLIC ACID SERUM: CPT

## 2023-08-22 PROCEDURE — 84443 ASSAY THYROID STIM HORMONE: CPT | Performed by: PSYCHIATRY & NEUROLOGY

## 2023-08-22 PROCEDURE — 84446 ASSAY OF VITAMIN E: CPT

## 2023-08-22 PROCEDURE — 82306 VITAMIN D 25 HYDROXY: CPT

## 2023-08-22 PROCEDURE — 86618 LYME DISEASE ANTIBODY: CPT

## 2023-08-22 PROCEDURE — 84207 ASSAY OF VITAMIN B-6: CPT

## 2023-08-22 PROCEDURE — 85652 RBC SED RATE AUTOMATED: CPT

## 2023-08-22 PROCEDURE — 99214 OFFICE O/P EST MOD 30 MIN: CPT | Performed by: PSYCHIATRY & NEUROLOGY

## 2023-08-22 PROCEDURE — 36415 COLL VENOUS BLD VENIPUNCTURE: CPT

## 2023-08-22 RX ORDER — VENLAFAXINE HYDROCHLORIDE 150 MG/1
1 CAPSULE, EXTENDED RELEASE ORAL DAILY
COMMUNITY
Start: 2023-08-10

## 2023-08-23 LAB — B BURGDOR IGG+IGM SER QL IA: NEGATIVE

## 2023-08-24 LAB — PYRIDOXAL PHOS SERPL-MCNC: 29.9 UG/L (ref 3.4–65.2)

## 2023-08-26 LAB
A-TOCOPHEROL VIT E SERPL-MCNC: 13.2 MG/L (ref 7–25.1)
GAMMA TOCOPHEROL SERPL-MCNC: 1.6 MG/L (ref 0.5–5.5)

## 2023-08-29 ENCOUNTER — TELEPHONE (OUTPATIENT)
Dept: NEUROLOGY | Facility: CLINIC | Age: 43
End: 2023-08-29
Payer: MEDICAID

## 2023-12-21 ENCOUNTER — OFFICE (OUTPATIENT)
Dept: URBAN - METROPOLITAN AREA CLINIC 64 | Facility: CLINIC | Age: 43
End: 2023-12-21
Payer: MEDICAID

## 2023-12-21 VITALS
WEIGHT: 234 LBS | HEIGHT: 73 IN | SYSTOLIC BLOOD PRESSURE: 108 MMHG | DIASTOLIC BLOOD PRESSURE: 74 MMHG | HEART RATE: 82 BPM

## 2023-12-21 DIAGNOSIS — R53.83 OTHER FATIGUE: ICD-10-CM

## 2023-12-21 DIAGNOSIS — E83.110 HEREDITARY HEMOCHROMATOSIS: ICD-10-CM

## 2023-12-21 DIAGNOSIS — K59.00 CONSTIPATION, UNSPECIFIED: ICD-10-CM

## 2023-12-21 PROCEDURE — 99213 OFFICE O/P EST LOW 20 MIN: CPT | Performed by: NURSE PRACTITIONER

## 2024-06-18 ENCOUNTER — OFFICE (OUTPATIENT)
Dept: URBAN - METROPOLITAN AREA CLINIC 64 | Facility: CLINIC | Age: 44
End: 2024-06-18
Payer: MEDICAID

## 2024-06-18 VITALS
SYSTOLIC BLOOD PRESSURE: 113 MMHG | DIASTOLIC BLOOD PRESSURE: 84 MMHG | WEIGHT: 236 LBS | HEIGHT: 73 IN | HEART RATE: 87 BPM

## 2024-06-18 DIAGNOSIS — K59.00 CONSTIPATION, UNSPECIFIED: ICD-10-CM

## 2024-06-18 DIAGNOSIS — E83.110 HEREDITARY HEMOCHROMATOSIS: ICD-10-CM

## 2024-06-18 PROCEDURE — 99214 OFFICE O/P EST MOD 30 MIN: CPT | Performed by: INTERNAL MEDICINE

## 2025-01-21 ENCOUNTER — OFFICE (OUTPATIENT)
Dept: URBAN - METROPOLITAN AREA CLINIC 64 | Facility: CLINIC | Age: 45
End: 2025-01-21
Payer: MEDICARE

## 2025-01-21 VITALS
DIASTOLIC BLOOD PRESSURE: 63 MMHG | HEIGHT: 73 IN | HEART RATE: 77 BPM | WEIGHT: 233 LBS | SYSTOLIC BLOOD PRESSURE: 103 MMHG

## 2025-01-21 DIAGNOSIS — K21.9 GASTRO-ESOPHAGEAL REFLUX DISEASE WITHOUT ESOPHAGITIS: ICD-10-CM

## 2025-01-21 DIAGNOSIS — E83.110 HEREDITARY HEMOCHROMATOSIS: ICD-10-CM

## 2025-01-21 PROCEDURE — 99212 OFFICE O/P EST SF 10 MIN: CPT | Performed by: NURSE PRACTITIONER
